# Patient Record
Sex: FEMALE | Race: WHITE | NOT HISPANIC OR LATINO | Employment: FULL TIME | URBAN - NONMETROPOLITAN AREA
[De-identification: names, ages, dates, MRNs, and addresses within clinical notes are randomized per-mention and may not be internally consistent; named-entity substitution may affect disease eponyms.]

---

## 2017-08-23 ENCOUNTER — OFFICE VISIT (OUTPATIENT)
Dept: OBSTETRICS AND GYNECOLOGY | Facility: CLINIC | Age: 32
End: 2017-08-23

## 2017-08-23 VITALS
WEIGHT: 154.4 LBS | HEART RATE: 76 BPM | BODY MASS INDEX: 27.36 KG/M2 | HEIGHT: 63 IN | SYSTOLIC BLOOD PRESSURE: 122 MMHG | OXYGEN SATURATION: 97 % | DIASTOLIC BLOOD PRESSURE: 82 MMHG

## 2017-08-23 DIAGNOSIS — Z30.011 OCP (ORAL CONTRACEPTIVE PILLS) INITIATION: ICD-10-CM

## 2017-08-23 DIAGNOSIS — Z01.419 ENCOUNTER FOR GYNECOLOGICAL EXAMINATION WITH PAPANICOLAOU SMEAR OF CERVIX: Primary | ICD-10-CM

## 2017-08-23 DIAGNOSIS — Z11.3 SCREENING FOR STD (SEXUALLY TRANSMITTED DISEASE): ICD-10-CM

## 2017-08-23 PROCEDURE — 87624 HPV HI-RISK TYP POOLED RSLT: CPT | Performed by: NURSE PRACTITIONER

## 2017-08-23 PROCEDURE — 88142 CYTOPATH C/V THIN LAYER: CPT | Performed by: PATHOLOGY

## 2017-08-23 PROCEDURE — 99385 PREV VISIT NEW AGE 18-39: CPT | Performed by: NURSE PRACTITIONER

## 2017-08-23 PROCEDURE — 87491 CHLMYD TRACH DNA AMP PROBE: CPT | Performed by: NURSE PRACTITIONER

## 2017-08-23 PROCEDURE — 87591 N.GONORRHOEAE DNA AMP PROB: CPT | Performed by: NURSE PRACTITIONER

## 2017-08-23 RX ORDER — ESCITALOPRAM OXALATE 10 MG/1
10 TABLET ORAL DAILY
COMMUNITY

## 2017-08-23 RX ORDER — DROSPIRENONE AND ETHINYL ESTRADIOL 0.02-3(28)
1 KIT ORAL DAILY
Qty: 28 TABLET | Refills: 12 | Status: SHIPPED | OUTPATIENT
Start: 2017-08-23

## 2017-08-23 RX ORDER — CETIRIZINE HYDROCHLORIDE 10 MG/1
10 TABLET ORAL DAILY
COMMUNITY

## 2017-08-23 NOTE — PROGRESS NOTES
Subjective   Chief Complaint   Patient presents with   • Gynecologic Exam     pap/well woman exam     Gena Shah is a 32 y.o. year old  presenting to be seen for her annual exam.  Today she inquires about conceiving and contraception at her age. She is currently using coitus interruptus. She would like to start OCP because it is the easiest to stop PRN. She states she is not sure if she wants children but wants to think closely about it. Encouraged pt to actively try x 6 months if she chooses to, if not successful and want to pursue further testing, I will refer to reproductive endocrinology. Pt verbalizes understanding.     Pt states she is prone to BV. We discussed vulvar hygiene. She takes frequent baths and has very sensitive skin.  We discussed vulvar hygiene handout at length. Encouraged Florajen probiotics. Pt agrees to try. She denies symptoms today.     She notes UTI after intercourse. Her PCP has her on Macrobid 1 tablet after intercourse. Well controlled with this.     Patient's last menstrual period was 2017 (approximate).    OB History      Para Term  AB TAB SAB Ectopic Multiple Living    0 0 0 0 0 0 0 0 0 0        Current birth control method: none and coitus interruptus.    She is sexually active.  In the past 12 months there has not been new sexual partners.  Condoms are not typically used.  She would like to be screened for STD's at today's exam.     Past 6 month menstrual history:    Cycle Frequency: regular every 26 days   Menstrual cycle character: flow is typically normal   Cycle Duration: 4 - 5   Number of heavy days of flows: 0   Dysmenorrhea: is not affecting her activities of daily living   PMS: is not affecting her activities of daily living   Intermenstrual bleeding present: no   Post-coital bleeding present: no     She exercises regularly: yes.  She wears her seat belt:yes.  She has concerns about domestic violence: no.  Last colonoscopy: Never  Last  "DEXA: Never  Last MMG: Never  Last pap: 2/20/14 last pap had well woman 6/2016  History of abnormal PAP: Yes, HPV +    The following portions of the patient's history were reviewed and updated as appropriate:problem list, current medications, allergies, past family history, past medical history, past social history and past surgical history.    Smoking status: Former Smoker                                                              Packs/day: 0.00      Years: 0.00      Smokeless status: Never Used                        History   Alcohol Use   • Yes     Comment: 5 per week      Review of Systems   Constitutional: Negative.    Respiratory: Negative.    Cardiovascular: Negative.    Gastrointestinal: Negative.    Endocrine: Negative.    Genitourinary: Negative.  Negative for menstrual problem and vaginal discharge.   Skin: Negative.         Adult acne   Neurological: Negative for dizziness, syncope, light-headedness and headaches.   Psychiatric/Behavioral: Negative for suicidal ideas. The patient is not nervous/anxious (well controlled with Lexapro).          Objective   /82  Pulse 76  Ht 63\" (160 cm)  Wt 154 lb 6.4 oz (70 kg)  LMP 08/04/2017 (Approximate)  SpO2 97%  Breastfeeding? No  BMI 27.35 kg/m2    General:  well developed; well nourished  no acute distress   Skin:  No suspicious lesions seen  moderate acne on forehead   Thyroid: not examined   Breasts:  Examined in supine position  Symmetric without masses or skin dimpling  Nipples normal without inversion, lesions or discharge  There are no palpable axillary nodes   Abdomen: soft, non-tender; no masses  no umbilical or inginual hernias are present  no hepato-splenomegaly  Normal findings: bowel sounds normal   Cardiac: Heart sounds are normal.  Regular rate and rhythm without murmur, gallop or rub.   Resp: Normal expansion.  Clear to auscultation.  No rales, rhonchi, or wheezing.   Psych: alert,oriented, in NAD with a full range of affect, normal " behavior and no psychotic features   Pelvis: Uterus:  Clinical staff was present for exam  External genitalia:  normal appearance of the external genitalia including Bartholin's and [place]'s glands.  :  urethral meatus normal; urethral hypermobility is absent.  Vaginal:  normal pink mucosa without prolapse or lesions and discharge present -  white and clumped  Cervix:  normal appearance.  Uterus:  normal size, shape and consistency  Adnexa:  normal bimanual exam of the adnexa.     Lab Review   No data reviewed    Imaging  No data reviewed       Assessment   1. Encounter for GYN exam with pap smear of cervix  2. Screening for STD  3. OCP initiation     Plan   1. Pap results: I will send card in mail or call if abnormal. RTC annually for well woman exam  2. G/C obtained. I will call with results and Rx prn.   3. She was instructed how to start her birth control.  It should be started on Sunday following the onset of her next cycle.  Because it may take 1 month to become effective, the use of alternative contraception for one month was stressed.  The potential for breakthrough bleeding for up to 3 cycles was also emphasized. Discussed what to do if 1 and 2 or more days of pills are missed. Mild side effects and ACHES warning signs reviewed. Patient verbalizes understanding. All questions were answered.   4. Adhere to vulvar hygiene regimen. Take Floragen probiotics daily. RTC with vaginitis symptoms.     New Medications Ordered This Visit   Medications   • drospirenone-ethinyl estradiol (DEWAYNE,GIANVI) 3-0.02 MG per tablet     Sig: Take 1 tablet by mouth Daily.     Dispense:  28 tablet     Refill:  12      This note was electronically signed.    Tanika Patel, APRN  August 24, 2017

## 2017-08-25 LAB
C TRACH RRNA CVX QL NAA+PROBE: NOT DETECTED
N GONORRHOEA RRNA SPEC QL NAA+PROBE: NOT DETECTED

## 2017-08-28 LAB
LAB AP CASE REPORT: NORMAL
LAB AP GYN ADDITIONAL INFORMATION: NORMAL
Lab: NORMAL
PATH INTERP SPEC-IMP: NORMAL
STAT OF ADQ CVX/VAG CYTO-IMP: NORMAL

## 2017-08-29 LAB — HPV I/H RISK 4 DNA CVX QL PROBE+SIG AMP: NEGATIVE

## 2022-02-19 PROCEDURE — 87635 SARS-COV-2 COVID-19 AMP PRB: CPT | Performed by: NURSE PRACTITIONER
